# Patient Record
Sex: MALE | Race: WHITE | NOT HISPANIC OR LATINO | Employment: FULL TIME | URBAN - METROPOLITAN AREA
[De-identification: names, ages, dates, MRNs, and addresses within clinical notes are randomized per-mention and may not be internally consistent; named-entity substitution may affect disease eponyms.]

---

## 2019-12-31 ENCOUNTER — SURGERY - HEALTHEAST (OUTPATIENT)
Dept: SURGERY | Facility: CLINIC | Age: 24
End: 2019-12-31

## 2019-12-31 ENCOUNTER — OFFICE VISIT - HEALTHEAST (OUTPATIENT)
Dept: SURGERY | Facility: CLINIC | Age: 24
End: 2019-12-31

## 2019-12-31 ENCOUNTER — COMMUNICATION - HEALTHEAST (OUTPATIENT)
Dept: SURGERY | Facility: CLINIC | Age: 24
End: 2019-12-31

## 2019-12-31 DIAGNOSIS — K42.9 UMBILICAL HERNIA WITHOUT OBSTRUCTION AND WITHOUT GANGRENE: ICD-10-CM

## 2020-01-03 ENCOUNTER — COMMUNICATION - HEALTHEAST (OUTPATIENT)
Dept: SURGERY | Facility: CLINIC | Age: 25
End: 2020-01-03

## 2020-01-14 ENCOUNTER — SURGERY - HEALTHEAST (OUTPATIENT)
Dept: SURGERY | Facility: CLINIC | Age: 25
End: 2020-01-14

## 2020-01-14 DIAGNOSIS — K42.9 UMBILICAL HERNIA WITHOUT OBSTRUCTION AND WITHOUT GANGRENE: ICD-10-CM

## 2020-01-24 ASSESSMENT — MIFFLIN-ST. JEOR: SCORE: 2065.61

## 2020-01-27 ENCOUNTER — ANESTHESIA - HEALTHEAST (OUTPATIENT)
Dept: SURGERY | Facility: AMBULATORY SURGERY CENTER | Age: 25
End: 2020-01-27

## 2020-01-28 ENCOUNTER — SURGERY - HEALTHEAST (OUTPATIENT)
Dept: SURGERY | Facility: AMBULATORY SURGERY CENTER | Age: 25
End: 2020-01-28

## 2020-02-20 ENCOUNTER — OFFICE VISIT - HEALTHEAST (OUTPATIENT)
Dept: SURGERY | Facility: CLINIC | Age: 25
End: 2020-02-20

## 2020-02-20 DIAGNOSIS — Z48.89 POSTOPERATIVE VISIT: ICD-10-CM

## 2021-03-12 ENCOUNTER — AMBULATORY - HEALTHEAST (OUTPATIENT)
Dept: SURGERY | Facility: CLINIC | Age: 26
End: 2021-03-12

## 2021-03-12 DIAGNOSIS — Z11.59 ENCOUNTER FOR SCREENING FOR OTHER VIRAL DISEASES: ICD-10-CM

## 2021-03-19 ASSESSMENT — MIFFLIN-ST. JEOR: SCORE: 2062.2

## 2021-03-22 ENCOUNTER — COMMUNICATION - HEALTHEAST (OUTPATIENT)
Dept: TELEHEALTH | Facility: CLINIC | Age: 26
End: 2021-03-22

## 2021-03-22 ENCOUNTER — AMBULATORY - HEALTHEAST (OUTPATIENT)
Dept: LAB | Facility: CLINIC | Age: 26
End: 2021-03-22

## 2021-03-22 DIAGNOSIS — Z11.59 ENCOUNTER FOR SCREENING FOR OTHER VIRAL DISEASES: ICD-10-CM

## 2021-03-23 ENCOUNTER — ANESTHESIA - HEALTHEAST (OUTPATIENT)
Dept: SURGERY | Facility: CLINIC | Age: 26
End: 2021-03-23

## 2021-03-23 LAB
SARS-COV-2 PCR COMMENT: NORMAL
SARS-COV-2 RNA SPEC QL NAA+PROBE: NEGATIVE
SARS-COV-2 VIRUS SPECIMEN SOURCE: NORMAL

## 2021-03-24 ENCOUNTER — SURGERY - HEALTHEAST (OUTPATIENT)
Dept: SURGERY | Facility: CLINIC | Age: 26
End: 2021-03-24

## 2021-03-24 ENCOUNTER — COMMUNICATION - HEALTHEAST (OUTPATIENT)
Dept: SCHEDULING | Facility: CLINIC | Age: 26
End: 2021-03-24

## 2021-03-24 ASSESSMENT — MIFFLIN-ST. JEOR: SCORE: 2005.79

## 2021-05-27 VITALS — DIASTOLIC BLOOD PRESSURE: 68 MMHG | SYSTOLIC BLOOD PRESSURE: 128 MMHG | OXYGEN SATURATION: 100 % | HEART RATE: 109 BPM

## 2021-06-04 VITALS
DIASTOLIC BLOOD PRESSURE: 68 MMHG | HEART RATE: 91 BPM | OXYGEN SATURATION: 98 % | BODY MASS INDEX: 32.41 KG/M2 | SYSTOLIC BLOOD PRESSURE: 126 MMHG | WEIGHT: 239 LBS

## 2021-06-04 VITALS — BODY MASS INDEX: 30.09 KG/M2 | HEIGHT: 73 IN | WEIGHT: 227 LBS

## 2021-06-04 NOTE — TELEPHONE ENCOUNTER
Received a call from [facility]. They did James's pre-op physical today and his PCP did not clear James for surgery. Per [facility], James's liver function tests came back abnormal. Patient is aware.    I called [facility] and spoke with Burak to cancel the case.     Cyndee Gooden,  Surgery Scheduling  [phone]

## 2021-06-04 NOTE — PROGRESS NOTES
HPI:  I am consulted by the ER in this 24 y.o. male regarding an umbilical hernia. He has noted this for the past 10 days. He has discomfort and a bulge. The pain is mild.    No Known Allergies      Current Outpatient Medications:      ibuprofen (ADVIL,MOTRIN) 800 MG tablet, Take 800 mg by mouth., Disp: , Rfl:     History reviewed. No pertinent past medical history.    Past Surgical History:   Procedure Laterality Date     WISDOM TOOTH EXTRACTION         Social History     Socioeconomic History     Marital status: Single     Spouse name: Not on file     Number of children: Not on file     Years of education: Not on file     Highest education level: Not on file   Occupational History     Not on file   Social Needs     Financial resource strain: Not on file     Food insecurity:     Worry: Not on file     Inability: Not on file     Transportation needs:     Medical: Not on file     Non-medical: Not on file   Tobacco Use     Smoking status: Former Smoker     Smokeless tobacco: Former User   Substance and Sexual Activity     Alcohol use: Not Currently     Drug use: Never     Sexual activity: Not on file   Lifestyle     Physical activity:     Days per week: Not on file     Minutes per session: Not on file     Stress: Not on file   Relationships     Social connections:     Talks on phone: Not on file     Gets together: Not on file     Attends Protestant service: Not on file     Active member of club or organization: Not on file     Attends meetings of clubs or organizations: Not on file     Relationship status: Not on file     Intimate partner violence:     Fear of current or ex partner: Not on file     Emotionally abused: Not on file     Physically abused: Not on file     Forced sexual activity: Not on file   Other Topics Concern     Not on file   Social History Narrative     Not on file       Review of Systems - Negative except as noted in the HPI    /68 (Patient Site: Right Arm, Patient Position: Sitting, Cuff  Size: Adult Regular)   Pulse 91   Wt (!) 239 lb (108.4 kg)   SpO2 98%   BMI 32.41 kg/m    Body mass index is 32.41 kg/m .    EXAM:  GENERAL: Obese male in no distress.  CARDIAC: RRR w/out murmur   CHEST/LUNG: Clear  ABDOMEN:  Umbilical hernia.  Abdomen soft and nontender.        Assessment/Plan: This patient has a  umbilical hernia. I discussed this at length with him.  I went over conservative management as well as surgical treatment of this.  I will plan on doing this via an open approach, with a supra-umbilical incision. I went over the small risks of surgery including but not limited to bleeding and infection. I discussed the expected recovery time as well. He will have an activity restriction for 4 weeks of no lifting over 20 pounds and no strenuous activity.   Will get this scheduled.      Fran Cheng MD  [facility] Department of Surgery

## 2021-06-05 VITALS — WEIGHT: 215.56 LBS | HEIGHT: 72 IN | BODY MASS INDEX: 29.2 KG/M2

## 2021-06-05 NOTE — ANESTHESIA POSTPROCEDURE EVALUATION
Patient: James Carroll  Procedure(s):  HERNIORRHAPHY, UMBILICAL, OPEN  Anesthesia type: MAC    Patient location: Phase II Recovery  Last vitals:   Vitals Value Taken Time   /62 1/28/2020  1:25 PM   Temp 36.4  C (97.6  F) 1/28/2020 12:54 PM   Pulse 63 1/28/2020  1:26 PM   Resp 16 1/28/2020  1:00 PM   SpO2 96 % 1/28/2020  1:26 PM   Vitals shown include unvalidated device data.  Post vital signs: stable  Level of consciousness: awake and responds to simple questions  Post-anesthesia pain: pain controlled  Post-anesthesia nausea and vomiting: no  Pulmonary: unassisted, return to baseline  Cardiovascular: stable and blood pressure at baseline  Hydration: adequate  Anesthetic events: no    QCDR Measures:  ASA# 11 - [place]-op Cardiac Arrest: ASA11B - Patient did NOT experience unanticipated cardiac arrest  ASA# 12 - [place]-op Mortality Rate: ASA12B - Patient did NOT die  ASA# 13 - PACU Re-Intubation Rate: NA - No ETT / LMA used for case  ASA# 10 - Composite Anes Safety: ASA10A - No serious adverse event    Additional Notes:

## 2021-06-05 NOTE — ANESTHESIA CARE TRANSFER NOTE
Last vitals:   Vitals:    01/28/20 1254   BP: (P) 104/55   Pulse: (P) 79   Resp: (P) 16   Temp: (P) 36.4  C (97.6  F)   SpO2: (P) 96%     Pt brought to phase 2 on 6L O2. Monitors applied. VSS.    Patient's level of consciousness is drowsy  Spontaneous respirations: yes  Maintains airway independently: yes  Dentition unchanged: yes  Oropharynx: oropharynx clear of all foreign objects    QCDR Measures:  ASA# 20 - Surgical Safety Checklist: WHO surgical safety checklist completed prior to induction    PQRS# 430 - Adult PONV Prevention: 4558F - Pt received => 2 anti-emetic agents (different classes) preop & intraop  ASA# 8 - Peds PONV Prevention: NA - Not pediatric patient, not GA or 2 or more risk factors NOT present  PQRS# 424 - Mari[place]-op Temp Management: 4559F - At least one body temp DOCUMENTED => 35.5C or 95.9F within required timeframe  PQRS# 426 - PACU Transfer Protocol: - Transfer of care checklist used  ASA# 14 - Acute Post-op Pain: ASA14B - Patient did NOT experience pain >= 7 out of 10

## 2021-06-05 NOTE — ANESTHESIA PREPROCEDURE EVALUATION
Anesthesia Evaluation      Patient summary reviewed   No history of anesthetic complications     Airway   Mallampati: II  Neck ROM: full   Pulmonary - normal exam   (+) asthma (no sx since 2011)  a smoker (quit 2019)                         Cardiovascular - negative ROS and normal exam  Exercise tolerance: > or = 4 METS   Neuro/Psych - negative ROS     Endo/Other    (+) obesity (bmi 30),      GI/Hepatic/Renal - negative ROS     Comments: Transient LFT elevation - u/s normal, labs normalized          Dental - normal exam                        Anesthesia Plan  Planned anesthetic: MAC  Versed/fentanyl/propofol  Ketamine PRN  Decadron/zofran    ASA 2   Induction: intravenous   Anesthetic plan and risks discussed with: patient and parent/guardian  Anesthesia plan special considerations: antiemetics,   Post-op plan: routine recovery        Results for orders placed or performed during the hospital encounter of 12/27/19   HM2(CBC w/o Differential)   Result Value Ref Range    WBC 8.8 4.0 - 11.0 thou/uL    RBC 5.20 4.40 - 6.20 mill/uL    Hemoglobin 15.8 14.0 - 18.0 g/dL    Hematocrit 46.5 40.0 - 54.0 %    MCV 89 80 - 100 fL    MCH 30.4 27.0 - 34.0 pg    MCHC 34.0 32.0 - 36.0 g/dL    RDW 11.6 11.0 - 14.5 %    Platelets 257 140 - 440 thou/uL    MPV 9.4 8.5 - 12.5 fL   Hepatic Profile   Result Value Ref Range    Bilirubin, Total 0.5 0.0 - 1.0 mg/dL    Bilirubin, Direct 0.2 <=0.5 mg/dL    Protein, Total 7.5 6.0 - 8.0 g/dL    Albumin 4.5 3.5 - 5.0 g/dL    Alkaline Phosphatase 63 45 - 120 U/L     (H) 0 - 40 U/L     (H) 0 - 45 U/L   Basic Metabolic Panel   Result Value Ref Range    Sodium 140 136 - 145 mmol/L    Potassium 3.6 3.5 - 5.0 mmol/L    Chloride 108 (H) 98 - 107 mmol/L    CO2 21 (L) 22 - 31 mmol/L    Anion Gap, Calculation 11 5 - 18 mmol/L    Glucose 107 70 - 125 mg/dL    Calcium 9.8 8.5 - 10.5 mg/dL    BUN 12 8 - 22 mg/dL    Creatinine 0.76 0.70 - 1.30 mg/dL    GFR MDRD Af Amer >60 >60 mL/min/1.73m2    GFR  MDRD Non Af Amer >60 >60 mL/min/1.73m2   Lipase   Result Value Ref Range    Lipase 13 0 - 52 U/L

## 2021-06-06 NOTE — PROGRESS NOTES
HPI: Pt is here for follow up umbilical hernia repair with Dr. Cheng on 1/28/2020.   he is doing well.  Pain is well controlled.  No difficulties with the surgical wound/wounds.  he is eating well and denies fever and chills.         /68 (Patient Site: Right Arm, Patient Position: Sitting, Cuff Size: Adult Regular)   Pulse (!) 109   SpO2 100%     EXAM:  GENERAL:Appears well  ABDOMEN:  Soft, +BS  SURGICAL WOUNDS:  Incisions healing well, no enduration or drainage.      Assessment/Plan: Doing well after surgery and should follow up as needed.    Kassi Clement , [facility] Surgery

## 2021-06-12 ENCOUNTER — HEALTH MAINTENANCE LETTER (OUTPATIENT)
Age: 26
End: 2021-06-12

## 2021-06-16 PROBLEM — K42.9 UMBILICAL HERNIA WITHOUT OBSTRUCTION AND WITHOUT GANGRENE: Status: ACTIVE | Noted: 2020-01-02

## 2021-06-16 NOTE — ANESTHESIA PREPROCEDURE EVALUATION
Anesthesia Evaluation      Patient summary reviewed   No history of anesthetic complications     Airway   Mallampati: II  Neck ROM: full   Pulmonary - normal exam                          Cardiovascular - negative ROS and normal exam   Neuro/Psych - negative ROS     Endo/Other - negative ROS      GI/Hepatic/Renal - negative ROS           Dental                         Anesthesia Plan  Planned anesthetic: general LMA    ASA 2   Induction: intravenous   Anesthetic plan and risks discussed with: patient

## 2021-06-16 NOTE — ANESTHESIA CARE TRANSFER NOTE
Last vitals:   Vitals:    03/24/21 1018   BP: 129/73   Pulse: 77   Resp: 12   Temp: 36.2  C (97.2  F)   SpO2: 100%     Patient's level of consciousness is drowsy  Spontaneous respirations: yes  Maintains airway independently: yes  Dentition unchanged: yes  Oropharynx: oropharynx clear of all foreign objects    QCDR Measures:  ASA# 20 - Surgical Safety Checklist: WHO surgical safety checklist completed prior to induction    PQRS# 430 - Adult PONV Prevention: 4558F - Pt received => 2 anti-emetic agents (different classes) preop & intraop  ASA# 8 - Peds PONV Prevention: NA - Not pediatric patient, not GA or 2 or more risk factors NOT present  PQRS# 424 - [place]-op Temp Management: 4559F - At least one body temp DOCUMENTED => 35.5C or 95.9F within required timeframe  PQRS# 426 - PACU Transfer Protocol: - Transfer of care checklist used  ASA# 14 - Acute Post-op Pain: ASA14B - Patient did NOT experience pain >= 7 out of 10

## 2021-06-16 NOTE — ANESTHESIA POSTPROCEDURE EVALUATION
Patient: James Carroll  Procedure(s):  REMOVAL, HARDWARE, LOWER EXTREMITY (Right)  MEDIAL MALLEOLAR OSTEOTOMY WITH BULK OSTEOCHONDRAL ALLOGRAFT TALAR DOME (Right)  Anesthesia type: general    Patient location: PACU  Last vitals:   Vitals Value Taken Time   /86 03/24/21 1100   Temp 36.2  C (97.1  F) 03/24/21 1050   Pulse 86 03/24/21 1119   Resp 16 03/24/21 1100   SpO2 96 % 03/24/21 1120   Vitals shown include unvalidated device data.  Post vital signs: stable  Level of consciousness: awake and responds to simple questions  Post-anesthesia pain: pain controlled  Post-anesthesia nausea and vomiting: no  Pulmonary: unassisted, return to baseline  Cardiovascular: stable and blood pressure at baseline  Hydration: adequate  Anesthetic events: no    QCDR Measures:  ASA# 11 - [place]-op Cardiac Arrest: ASA11B - Patient did NOT experience unanticipated cardiac arrest  ASA# 12 - [place]-op Mortality Rate: ASA12B - Patient did NOT die  ASA# 13 - PACU Re-Intubation Rate: ASA13B - Patient did NOT require a new airway mgmt  ASA# 10 - Composite Anes Safety: ASA10A - No serious adverse event    Additional Notes:

## 2021-06-16 NOTE — ANESTHESIA PROCEDURE NOTES
Peripheral Block    Patient location during procedure: pre-op  Start time: 3/24/2021 6:35 AM  End time: 3/24/2021 6:40 AM  post-op analgesia per surgeon order as noted in medical record  Staffing:  Performing  Anesthesiologist: Quinten Spann IV, MD  Preanesthetic Checklist  Completed: patient identified, site marked, risks, benefits, and alternatives discussed, timeout performed, consent obtained, at patient's request, airway assessed, oxygen available, suction available, emergency drugs available and hand hygiene performed  Peripheral Block  Block type: sciatic, popliteal  Prep: ChloraPrep  Patient position: supine  Patient monitoring: cardiac monitor, continuous pulse oximetry, blood pressure and heart rate  Laterality: right  Injection technique: ultrasound guided    Ultrasound used to visualize needle placement in proximity to nerve being blocked: yes   US used to visualize anesthetic spread  Visualized anatomic structures normal  No Pathological Findings  Permanent ultrasound image captured for medical record  Sterile gel and probe cover used for ultrasound.  Needle  Needle type: echogenic   Needle gauge: 20G  Needle length: 4 in  no peripheral nerve catheter placed  Assessment  Injection assessment: negative aspiration for heme, no paresthesia on injection, incremental injection and no difficulty with injection

## 2021-06-16 NOTE — ANESTHESIA PROCEDURE NOTES
Peripheral Block    Patient location during procedure: pre-op  Start time: 3/24/2021 6:40 AM  End time: 3/24/2021 6:42 AM  post-op analgesia per surgeon order as noted in medical record  Staffing:  Performing  Anesthesiologist: Quinten Spann IV, MD  Preanesthetic Checklist  Completed: patient identified, site marked, risks, benefits, and alternatives discussed, timeout performed, consent obtained, at patient's request, airway assessed, oxygen available, suction available, emergency drugs available and hand hygiene performed  Peripheral Block  Block type: saphenous, adductor canal block  Prep: ChloraPrep  Patient position: supine  Patient monitoring: cardiac monitor, continuous pulse oximetry, heart rate and blood pressure  Laterality: right  Injection technique: ultrasound guided    Ultrasound used to visualize needle placement in proximity to nerve being blocked: yes   US used to visualize anesthetic spread  Visualized anatomic structures normal  No Pathological Findings  Permanent ultrasound image captured for medical record    Needle  Needle type: echogenic   Needle gauge: 20G  Needle length: 4 in  no peripheral nerve catheter placed  Assessment  Injection assessment: negative aspiration for heme, no paresthesia on injection, no difficulty with injection and incremental injection

## 2021-06-20 NOTE — LETTER
Letter by Fran Cheng MD at      Author: Fran Cheng MD Service: -- Author Type: --    Filed:  Encounter Date: 12/31/2019 Status: Signed         December 31, 2019     Patient: James Carroll   YOB: 1995   Date of Visit: 12/31/2019   Date of Surgery: 1/7/2020    To Whom It May Concern:    James Carroll is scheduled for surgery on 1/7/20. Mr. Carroll will be unable to perform any duties from 1/7/20 - 2/4/20 during recovery time. He may return to work on 2/5/20 with no restrictions. .    If you have any questions or concerns, please don't hesitate to call.    Sincerely,        Electronically signed by Fran Cheng MD

## 2021-07-03 NOTE — ADDENDUM NOTE
Addendum Note by Fran Cheng MD at 1/14/2020 10:54 AM     Author: Fran Cheng MD Service: -- Author Type: Physician    Filed: 1/22/2020 10:49 AM Encounter Date: 1/14/2020 Status: Signed    : Fran Cheng MD (Physician)    Addended by: FRAN CHENG on: 1/22/2020 10:49 AM        Modules accepted: Orders         Adequate: hears normal conversation without difficulty

## 2021-10-02 ENCOUNTER — HEALTH MAINTENANCE LETTER (OUTPATIENT)
Age: 26
End: 2021-10-02

## 2022-04-05 ENCOUNTER — HOSPITAL ENCOUNTER (EMERGENCY)
Facility: CLINIC | Age: 27
Discharge: HOME | End: 2022-04-05
Attending: EMERGENCY MEDICINE | Admitting: EMERGENCY MEDICINE
Payer: COMMERCIAL

## 2022-04-05 VITALS
BODY MASS INDEX: 23.89 KG/M2 | WEIGHT: 176.37 LBS | HEART RATE: 68 BPM | RESPIRATION RATE: 16 BRPM | SYSTOLIC BLOOD PRESSURE: 115 MMHG | HEIGHT: 72 IN | TEMPERATURE: 98.9 F | DIASTOLIC BLOOD PRESSURE: 76 MMHG | OXYGEN SATURATION: 99 %

## 2022-04-05 DIAGNOSIS — M54.10 RADICULAR PAIN: ICD-10-CM

## 2022-04-05 DIAGNOSIS — M62.838 MUSCLE SPASM: ICD-10-CM

## 2022-04-05 PROCEDURE — 250N000013 HC RX MED GY IP 250 OP 250 PS 637: Performed by: EMERGENCY MEDICINE

## 2022-04-05 PROCEDURE — 99284 EMERGENCY DEPT VISIT MOD MDM: CPT | Mod: 25

## 2022-04-05 PROCEDURE — 96372 THER/PROPH/DIAG INJ SC/IM: CPT | Performed by: EMERGENCY MEDICINE

## 2022-04-05 PROCEDURE — 250N000011 HC RX IP 250 OP 636: Performed by: EMERGENCY MEDICINE

## 2022-04-05 RX ORDER — LIDOCAINE 4 G/G
1 PATCH TOPICAL ONCE
Status: DISCONTINUED | OUTPATIENT
Start: 2022-04-05 | End: 2022-04-05 | Stop reason: HOSPADM

## 2022-04-05 RX ORDER — LIDOCAINE 50 MG/G
1 PATCH TOPICAL EVERY 24 HOURS
Qty: 6 PATCH | Refills: 0 | Status: SHIPPED | OUTPATIENT
Start: 2022-04-05 | End: 2022-04-15

## 2022-04-05 RX ORDER — CYCLOBENZAPRINE HCL 10 MG
10 TABLET ORAL 3 TIMES DAILY PRN
Qty: 18 TABLET | Refills: 0 | Status: SHIPPED | OUTPATIENT
Start: 2022-04-05 | End: 2022-04-11

## 2022-04-05 RX ORDER — IBUPROFEN 800 MG/1
800 TABLET, FILM COATED ORAL EVERY 8 HOURS PRN
Qty: 24 TABLET | Refills: 0 | Status: SHIPPED | OUTPATIENT
Start: 2022-04-05 | End: 2022-04-13

## 2022-04-05 RX ORDER — KETOROLAC TROMETHAMINE 30 MG/ML
30 INJECTION, SOLUTION INTRAMUSCULAR; INTRAVENOUS ONCE
Status: COMPLETED | OUTPATIENT
Start: 2022-04-05 | End: 2022-04-05

## 2022-04-05 RX ADMIN — KETOROLAC TROMETHAMINE 30 MG: 30 INJECTION, SOLUTION INTRAMUSCULAR; INTRAVENOUS at 03:24

## 2022-04-05 RX ADMIN — LIDOCAINE 1 PATCH: 246 PATCH TOPICAL at 03:20

## 2022-04-05 ASSESSMENT — ENCOUNTER SYMPTOMS
BACK PAIN: 1
NECK PAIN: 1

## 2022-04-05 NOTE — ED PROVIDER NOTES
EMERGENCY DEPARTMENT ENCOUNTER      NAME: James Carroll  AGE: 26 year old male  YOB: 1995  MRN: [id number]  EVALUATION DATE & TIME: 4/5/2022  3:00 AM    PCP: No primary care provider on file.    ED PROVIDER: Radha Miller M.D.      Chief Complaint   Patient presents with     Shoulder Pain         FINAL IMPRESSION:  1. Muscle spasm    2. Radicular pain        MEDICAL DECISION MAKING:    Pertinent Labs & Imaging studies reviewed. (See chart for details)  ED Course as of 04/05/22 0547   Tue Apr 05, 2022   0317 Afebrile.  Vital signs are unremarkable.  Patient is coming in with right-sided neck pain that radiates down his shoulder and down his arm.  Does have a history he states of bulging disks at C2 and C5.  No falls or recent injury.  No procedures.  No numbness or tingling just this radicular type pain that he is describing.  Worse when positioning of his head.  Worse with different positions of his arm or shoulder.Physical exam for patient here with strong  strength distally.  Strong radial and ulnar pulses.  Does not have any tenderness or discomfort with pronation and supination as well as any movement of his elbow.  Am able to passively range his shoulder.  He does have pain when he looks down towards his hand.  Has significant muscle tenderness along the trapezius muscle on that side.  Does not have any midline C-spine tenderness.Patient likely with muscle spasm causing some nerve entrapment leading to radicular pain.  Has not taken any medications prior to coming in.  We will give him a lidocaine patch here, he did drive so we will give him Toradol.  Instructed to use heat.  Will discharge with exercise regimen, ibuprofen, Flexeril.           Critical care: 0 minutes excluding separately billable procedures.  Includes bedside management, time reviewing test results, review of records, discussing the case with staff, documenting the medical record and time spent with family members  (or surrogate decision makers) discussing specific treatment issues.          ED COURSE:  3:05 AM I met with the patient, obtained history, performed an initial exam, and discussed options and plan for diagnostics and treatment here in the ED. We discussed the plan for discharge and the patient is agreeable. Reviewed supportive cares, symptomatic treatment, outpatient follow up, and reasons to return to the Emergency Department. Patient to be discharged by ED RN.     The importance of close follow up was discussed. We reviewed warning signs and symptoms, and I instructed Mr. Carroll to return to the emergency department immediately if he develops any new or worsening symptoms. I provided additional verbal discharge instructions. Mr. Carroll expressed understanding and agreement with this plan of care, his questions were answered, and he was discharged in stable condition.     MEDICATIONS GIVEN IN THE EMERGENCY:  Medications   Lidocaine (LIDOCARE) 4 % Patch 1 patch (1 patch Transdermal Patch/Med Applied 4/5/22 0320)     And   lidocaine patch in PLACE (has no administration in time range)   ketorolac (TORADOL) injection 30 mg (30 mg Intramuscular Given 4/5/22 0324)       NEW PRESCRIPTIONS STARTED AT TODAY'S ER VISIT:  Discharge Medication List as of 4/5/2022  3:41 AM      START taking these medications    Details   cyclobenzaprine (FLEXERIL) 10 MG tablet Take 1 tablet (10 mg) by mouth 3 times daily as needed for muscle spasms, Disp-18 tablet, R-0, Local Print      !! ibuprofen (ADVIL/MOTRIN) 800 MG tablet Take 1 tablet (800 mg) by mouth every 8 hours as needed for moderate pain, Disp-24 tablet, R-0, Local Print      lidocaine (LIDODERM) 5 % patch Place 1 patch onto the skin every 24 hours for 10 daysDisp-6 patch, R-0Local Print       !! - Potential duplicate medications found. Please discuss with provider.             =================================================================    HPI    Patient information  was obtained from: Patient    Use of : N/A    James Carroll is a 26 year old male with no pertinent history who presents to the ED via walk-in for the evaluation of neck pain.    Patient reports right-sided neck pain that started two days ago in the morning. He states that pain radiates to right shoulder and down his spine. Patient notes that pain is worse with movement. He has not tried anything fo relief of symptoms. Patient reports that last July, he states that he was told he had tissue and nerve damage to C2 and C5. Otherwise, he denies any weakness, recent falls, trauma, or injury to area. No other complaints at this time.    REVIEW OF SYSTEMS   Review of Systems   Musculoskeletal: Positive for back pain (secondary to neck pain) and neck pain (right-sided).        Positive for right shoulder pain secondary to neck pain   All other systems reviewed and are negative.    PAST MEDICAL HISTORY:  Past Medical History:   Diagnosis Date     Asthma        PAST SURGICAL HISTORY:  Past Surgical History:   Procedure Laterality Date     ANKLE SURGERY      Twice     HERNIA REPAIR, UMBILICAL N/A 1/28/2020    Procedure: HERNIORRHAPHY, UMBILICAL, OPEN;  Surgeon: Fran Cheng MD;  Location: [facility];  Service: General     REMOVE HARDWARE LOWER EXTREMITY Right 3/24/2021    Procedure: REMOVAL, HARDWARE, RIGHT ANKLE;  Surgeon: Bryce Mott DO;  Location: [facility] OR;  Service: Orthopedics     WISDOM TOOTH EXTRACTION       ZZC OSTEOTOMY TIBIA Right 3/24/2021    Procedure: RIGHT MEDIAL MALLEOLAR OSTEOTOMY WITH BULK OSTEOCHONDRAL ALLOGRAFT TALAR DOME;  Surgeon: Bryce Mott DO;  Location: [facility];  Service: Orthopedics       CURRENT MEDICATIONS:      Current Facility-Administered Medications:      Lidocaine (LIDOCARE) 4 % Patch 1 patch, 1 patch, Transdermal, Once, 1 patch at 04/05/22 0320 **AND** lidocaine patch in PLACE, , Transdermal, Q8H, Katia Miller MD    Current  Outpatient Medications:      cyclobenzaprine (FLEXERIL) 10 MG tablet, Take 1 tablet (10 mg) by mouth 3 times daily as needed for muscle spasms, Disp: 18 tablet, Rfl: 0     ibuprofen (ADVIL/MOTRIN) 800 MG tablet, Take 1 tablet (800 mg) by mouth every 8 hours as needed for moderate pain, Disp: 24 tablet, Rfl: 0     lidocaine (LIDODERM) 5 % patch, Place 1 patch onto the skin every 24 hours for 10 days, Disp: 6 patch, Rfl: 0     acetaminophen (TYLENOL) 325 MG tablet, [ACETAMINOPHEN (TYLENOL) 325 MG TABLET] Take 2 tablets (650 mg total) by mouth every 4 (four) hours as needed for pain (mild pain)., Disp: 100 tablet, Rfl: 0     aspirin 81 MG EC tablet, [ASPIRIN 81 MG EC TABLET] Take 1 tablet (81 mg total) by mouth 2 (two) times a day., Disp: 60 tablet, Rfl: 0     hydrOXYzine HCL (ATARAX) 25 MG tablet, [HYDROXYZINE HCL (ATARAX) 25 MG TABLET] Take 1 tablet (25 mg total) by mouth every 6 (six) hours as needed for itching (with pain, moderate pain)., Disp: 30 tablet, Rfl: 0     ibuprofen (ADVIL,MOTRIN) 200 MG tablet, [IBUPROFEN (ADVIL,MOTRIN) 200 MG TABLET] Take 200 mg by mouth every 6 (six) hours as needed for pain., Disp: , Rfl:      oxyCODONE (ROXICODONE) 5 MG immediate release tablet, [OXYCODONE (ROXICODONE) 5 MG IMMEDIATE RELEASE TABLET] Take 1-2 tablets (5-10 mg total) by mouth every 4 (four) hours as needed for pain (moderate to severe pain)., Disp: 30 tablet, Rfl: 0     senna-docusate (PERICOLACE) 8.6-50 mg tablet, [SENNA-DOCUSATE (PERICOLACE) 8.6-50 MG TABLET] Take 1-2 tablets by mouth 2 (two) times a day. Take while on oral narcotics to prevent or treat constipation., Disp: 30 tablet, Rfl: 0    ALLERGIES:  No Known Allergies    FAMILY HISTORY:  History reviewed. No pertinent family history.    SOCIAL HISTORY:   Social History     Socioeconomic History     Marital status: Single     Spouse name: Not on file     Number of children: Not on file     Years of education: Not on file     Highest education level: Not on  file   Occupational History     Not on file   Tobacco Use     Smoking status: Former Smoker     Smokeless tobacco: Former User   Substance and Sexual Activity     Alcohol use: Not Currently     Drug use: Never     Sexual activity: Not on file   Other Topics Concern     Not on file   Social History Narrative     Not on file     Social Determinants of Health     Financial Resource Strain: Not on file   Food Insecurity: Not on file   Transportation Needs: Not on file   Physical Activity: Not on file   Stress: Not on file   Social Connections: Not on file   Intimate Partner Violence: Not on file   Housing Stability: Not on file       PHYSICAL EXAM:    Vitals: /76   Pulse 68   Temp 98.9  F (37.2  C)   Resp 16   Ht 1.829 m (6')   Wt 80 kg (176 lb 5.9 oz)   SpO2 99%   BMI 23.92 kg/m     General:. Alert and interactive, comfortable appearing.  HENT: Oropharynx without erythema or exudates. MMM.  TMs clear bilaterally.  Eyes: Pupils mid-sized and equally reactive.   Neck: Full AROM.  No midline tenderness to palpation. Pain when looking down at hand. Significant muscle tenderness along the trapezius muscle on right-side.  Cardiovascular: Regular rate and rhythm. Peripheral pulses 2+ bilaterally.  Chest/Pulmonary: Normal work of breathing. Lung sounds clear and equal throughout, no wheezes or crackles. No chest wall tenderness or deformities.  Abdomen: Soft, nondistended. Nontender without guarding or rebound.  Back/Spine: No CVA or midline tenderness.  Extremities: Normal ROM of all major joints. No lower extremity edema. Strong radial and ulnar pulses. No tenderness or discomfort with pronation and supination as well as any movement of right elbow. Able to passively range right shoulder.   Skin: Warm and dry. Normal skin color.   Neuro: Speech clear. CNs grossly intact. Moves all extremities appropriately. Strength and sensation grossly intact to all extremities. Strong  strength distally.   Psych: Normal  affect/mood, cooperative, memory appropriate.     LAB:  All pertinent labs reviewed and interpreted.  Labs Ordered and Resulted from Time of ED Arrival to Time of ED Departure - No data to display    RADIOLOGY:  No orders to display         I, Carley Peng, am serving as a scribe to document services personally performed by Dr. Radha Miller  based on my observation and the provider's statements to me. I, Radha Miller MD attest that Carley Peng is acting in a scribe capacity, has observed my performance of the services and has documented them in accordance with my direction.      Radha Miller M.D.  Emergency Medicine  [facility] EMERGENCY ROOM  [address] [address] [phone]  Dept: [phone]     Katia Miller MD  04/05/22 0523

## 2022-04-05 NOTE — ED TRIAGE NOTES
Patient has a history of neck pain that spreads to his back and shoulders. Patient was sitting down and started to have shoulder pain that worsened and began to should down his right arm. Pain started on Sunday morning. Patient was not performing any activities that could have triggered pain response.

## 2022-04-05 NOTE — DISCHARGE INSTRUCTIONS
Please take medications as prescribed for pain.  I did put in a referral to establish primary care to discuss this further if this continues to be ongoing.  Return for any new or worsening symptoms.

## 2022-07-09 ENCOUNTER — HEALTH MAINTENANCE LETTER (OUTPATIENT)
Age: 27
End: 2022-07-09

## 2022-07-13 ENCOUNTER — HOSPITAL ENCOUNTER (EMERGENCY)
Facility: CLINIC | Age: 27
Discharge: HOME | End: 2022-07-13
Attending: EMERGENCY MEDICINE | Admitting: EMERGENCY MEDICINE
Payer: COMMERCIAL

## 2022-07-13 ENCOUNTER — APPOINTMENT (OUTPATIENT)
Dept: RADIOLOGY | Facility: CLINIC | Age: 27
End: 2022-07-13
Attending: EMERGENCY MEDICINE
Payer: COMMERCIAL

## 2022-07-13 VITALS
SYSTOLIC BLOOD PRESSURE: 125 MMHG | OXYGEN SATURATION: 98 % | RESPIRATION RATE: 14 BRPM | TEMPERATURE: 98.2 F | HEART RATE: 56 BPM | WEIGHT: 175 LBS | DIASTOLIC BLOOD PRESSURE: 78 MMHG | BODY MASS INDEX: 23.73 KG/M2

## 2022-07-13 DIAGNOSIS — R07.9 CHEST PAIN, UNSPECIFIED TYPE: ICD-10-CM

## 2022-07-13 LAB
ALBUMIN SERPL-MCNC: 4.3 G/DL (ref 3.5–5)
ALP SERPL-CCNC: 61 U/L (ref 45–120)
ALT SERPL W P-5'-P-CCNC: 26 U/L (ref 0–45)
ANION GAP SERPL CALCULATED.3IONS-SCNC: 6 MMOL/L (ref 5–18)
AST SERPL W P-5'-P-CCNC: 29 U/L (ref 0–40)
BASOPHILS # BLD AUTO: 0.1 10E3/UL (ref 0–0.2)
BASOPHILS NFR BLD AUTO: 1 %
BILIRUB SERPL-MCNC: 0.6 MG/DL (ref 0–1)
BUN SERPL-MCNC: 13 MG/DL (ref 8–22)
CALCIUM SERPL-MCNC: 9.5 MG/DL (ref 8.5–10.5)
CHLORIDE BLD-SCNC: 107 MMOL/L (ref 98–107)
CO2 SERPL-SCNC: 26 MMOL/L (ref 22–31)
CREAT SERPL-MCNC: 0.82 MG/DL (ref 0.7–1.3)
EOSINOPHIL # BLD AUTO: 0.1 10E3/UL (ref 0–0.7)
EOSINOPHIL NFR BLD AUTO: 1 %
ERYTHROCYTE [DISTWIDTH] IN BLOOD BY AUTOMATED COUNT: 11.5 % (ref 10–15)
GFR SERPL CREATININE-BSD FRML MDRD: >90 ML/MIN/1.73M2
GLUCOSE BLD-MCNC: 88 MG/DL (ref 70–125)
HCT VFR BLD AUTO: 43.8 % (ref 40–53)
HGB BLD-MCNC: 15 G/DL (ref 13.3–17.7)
IMM GRANULOCYTES # BLD: 0 10E3/UL
IMM GRANULOCYTES NFR BLD: 0 %
LYMPHOCYTES # BLD AUTO: 1.9 10E3/UL (ref 0.8–5.3)
LYMPHOCYTES NFR BLD AUTO: 25 %
MAGNESIUM SERPL-MCNC: 2.1 MG/DL (ref 1.8–2.6)
MCH RBC QN AUTO: 31.1 PG (ref 26.5–33)
MCHC RBC AUTO-ENTMCNC: 34.2 G/DL (ref 31.5–36.5)
MCV RBC AUTO: 91 FL (ref 78–100)
MONOCYTES # BLD AUTO: 0.5 10E3/UL (ref 0–1.3)
MONOCYTES NFR BLD AUTO: 7 %
NEUTROPHILS # BLD AUTO: 4.9 10E3/UL (ref 1.6–8.3)
NEUTROPHILS NFR BLD AUTO: 66 %
NRBC # BLD AUTO: 0 10E3/UL
NRBC BLD AUTO-RTO: 0 /100
PLATELET # BLD AUTO: 227 10E3/UL (ref 150–450)
POTASSIUM BLD-SCNC: 4.5 MMOL/L (ref 3.5–5)
PROT SERPL-MCNC: 7.1 G/DL (ref 6–8)
RBC # BLD AUTO: 4.82 10E6/UL (ref 4.4–5.9)
SODIUM SERPL-SCNC: 139 MMOL/L (ref 136–145)
TROPONIN I SERPL-MCNC: <0.01 NG/ML (ref 0–0.29)
WBC # BLD AUTO: 7.4 10E3/UL (ref 4–11)

## 2022-07-13 PROCEDURE — 80053 COMPREHEN METABOLIC PANEL: CPT | Performed by: EMERGENCY MEDICINE

## 2022-07-13 PROCEDURE — 99285 EMERGENCY DEPT VISIT HI MDM: CPT | Mod: 25

## 2022-07-13 PROCEDURE — 71046 X-RAY EXAM CHEST 2 VIEWS: CPT

## 2022-07-13 PROCEDURE — 84484 ASSAY OF TROPONIN QUANT: CPT | Performed by: EMERGENCY MEDICINE

## 2022-07-13 PROCEDURE — 36415 COLL VENOUS BLD VENIPUNCTURE: CPT | Performed by: EMERGENCY MEDICINE

## 2022-07-13 PROCEDURE — 85025 COMPLETE CBC W/AUTO DIFF WBC: CPT | Performed by: EMERGENCY MEDICINE

## 2022-07-13 PROCEDURE — 83735 ASSAY OF MAGNESIUM: CPT | Performed by: EMERGENCY MEDICINE

## 2022-07-13 PROCEDURE — 93005 ELECTROCARDIOGRAM TRACING: CPT | Performed by: EMERGENCY MEDICINE

## 2022-07-13 ASSESSMENT — ENCOUNTER SYMPTOMS
GASTROINTESTINAL NEGATIVE: 1
NAUSEA: 0
COUGH: 0
FEVER: 0
LIGHT-HEADEDNESS: 1
CHEST TIGHTNESS: 1
DIZZINESS: 1
VOMITING: 0
SHORTNESS OF BREATH: 1
DIARRHEA: 0

## 2022-07-13 NOTE — ED TRIAGE NOTES
off and on chest pain for 4 days. In tonight because pain would not go away. Some sob, and dizziness with pain. Denies nausea, sweatiness with pain. States upper back is tight. 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 Pain less severe when laying down. States is worse when up and walking is worse.

## 2022-07-13 NOTE — ED PROVIDER NOTES
EMERGENCY DEPARTMENT ENCOUNTER      NAME: James Carroll  AGE: 27 year old male  YOB: 1995  MRN: [id number]  EVALUATION DATE & TIME: 7/13/2022  2:04 AM    PCP: No Ref-Primary, Physician    ED PROVIDER: Geoffrey Bob M.D.      Chief Complaint   Patient presents with     Chest Pain     Shortness of Breath         FINAL IMPRESSION:  1. Chest pain, unspecified type          ED COURSE & MEDICAL DECISION MAKING:    Pertinent Labs & Imaging studies reviewed. (See chart for details)  27 year old male presents to the Emergency Department for evaluation of chest pain or shortness of breath.  Been going on for quite some time but worse over the last week.  EKG is negative.  Troponin is negative.  Given the amount of time he is had chest pain I will think serial troponins are negative.  He is low risk for cardiac disease.  Patient negative by PERC criteria.  I do not think this is PE.  Chest x-ray is clear.  Labs otherwise unremarkable.  Unclear what is causing his chest pain or shortness of breath.  Do not see an emergent cause.  No signs of dissection pneumothorax or pneumonia.  Will discharge home.  Will follow up with primary.  Did put in a referral for primary care.    2:06 AM I met with the patient to gather history and to perform my initial exam. I discussed the plan for care while in the Emergency Department. PPE: eye protection, surgical mask and nitrile gloves.  3:00 AM Results were communicated with the patient. Discussed discharge plans along with return precautions. Patient was agreeable with plan.        At the conclusion of the encounter I discussed the results of all of the tests and the disposition. The questions were answered. The patient or family acknowledged understanding and was agreeable with the care plan.       MEDICATIONS GIVEN IN THE EMERGENCY:  Medications - No data to display    NEW PRESCRIPTIONS STARTED AT TODAY'S ER VISIT  New Prescriptions    No medications on file           =================================================================    HPI    Patient information was obtained from: patient     Use of : N/A        James Carroll is a 27 year old male with a pertinent history of migraine headaches, who presents to this ED via walk in for evaluation of shortness of breath, dizziness, lightheadness, mid chest pressure.    Patient here today for worsening mid-chest pressure tightness that has been constant over the past day. Initially he had it intermittently last week but had came back today. He did get episodes of dizziness as a child and also felt it today with lightheadness as well. There is no radiation of his pain. He has also been slightly short of breath. He was a former smoker but now only smokes about 1-2 times a week. He does not have a primary care provider but does go to a regular clinic. No daily prescription medications. No associated fever, cough, changes in bowel or bladder habits, nausea, vomiting, diarrhea. No other medical complaints at this time.       REVIEW OF SYSTEMS   Review of Systems   Constitutional: Negative for fever.   Respiratory: Positive for chest tightness (mid chest pressure) and shortness of breath. Negative for cough.    Gastrointestinal: Negative.  Negative for diarrhea, nausea and vomiting.   Genitourinary: Negative.    Neurological: Positive for dizziness and light-headedness.   All other systems reviewed and are negative.       PAST MEDICAL HISTORY:  Past Medical History:   Diagnosis Date     Asthma        PAST SURGICAL HISTORY:  Past Surgical History:   Procedure Laterality Date     ANKLE SURGERY      Twice     HERNIA REPAIR, UMBILICAL N/A 1/28/2020    Procedure: HERNIORRHAPHY, UMBILICAL, OPEN;  Surgeon: Fran Cheng MD;  Location: [facility];  Service: General     REMOVE HARDWARE LOWER EXTREMITY Right 3/24/2021    Procedure: REMOVAL, HARDWARE, RIGHT ANKLE;  Surgeon: Bryce Mott DO;  Location: [facility]  Main OR;  Service: Orthopedics     WISDOM TOOTH EXTRACTION       ZZC OSTEOTOMY TIBIA Right 3/24/2021    Procedure: RIGHT MEDIAL MALLEOLAR OSTEOTOMY WITH BULK OSTEOCHONDRAL ALLOGRAFT TALAR DOME;  Surgeon: Bryce Mott DO;  Location: [facility] Main OR;  Service: Orthopedics           CURRENT MEDICATIONS:    No current facility-administered medications for this encounter.     Current Outpatient Medications   Medication     acetaminophen (TYLENOL) 325 MG tablet     aspirin 81 MG EC tablet     hydrOXYzine HCL (ATARAX) 25 MG tablet     ibuprofen (ADVIL,MOTRIN) 200 MG tablet     oxyCODONE (ROXICODONE) 5 MG immediate release tablet     senna-docusate (PERICOLACE) 8.6-50 mg tablet         ALLERGIES:  No Known Allergies    FAMILY HISTORY:  History reviewed. No pertinent family history.    SOCIAL HISTORY:   Social History     Socioeconomic History     Marital status: Single   Tobacco Use     Smoking status: Former Smoker     Smokeless tobacco: Former User   Substance and Sexual Activity     Alcohol use: Not Currently     Drug use: Never       VITALS:  /84   Pulse 65   Temp 98.2  F (36.8  C) (Temporal)   Resp 14   Wt 79.4 kg (175 lb)   SpO2 100%   BMI 23.73 kg/m      PHYSICAL EXAM    Physical Exam  Constitutional:       General: He is not in acute distress.     Appearance: He is not diaphoretic.   HENT:      Head: Atraumatic.   Eyes:      General: No scleral icterus.     Pupils: Pupils are equal, round, and reactive to light.   Cardiovascular:      Heart sounds: Normal heart sounds.   Pulmonary:      Effort: No respiratory distress.      Breath sounds: Normal breath sounds.   Abdominal:      General: Bowel sounds are normal.      Palpations: Abdomen is soft.      Tenderness: There is no abdominal tenderness.   Musculoskeletal:         General: No tenderness.   Skin:     General: Skin is warm.      Findings: No rash.           LAB:  All pertinent labs reviewed and interpreted.  Labs Ordered and Resulted from  Time of ED Arrival to Time of ED Departure   COMPREHENSIVE METABOLIC PANEL - Normal       Result Value    Sodium 139      Potassium 4.5      Chloride 107      Carbon Dioxide (CO2) 26      Anion Gap 6      Urea Nitrogen 13      Creatinine 0.82      Calcium 9.5      Glucose 88      Alkaline Phosphatase 61      AST 29      ALT 26      Protein Total 7.1      Albumin 4.3      Bilirubin Total 0.6      GFR Estimate >90     TROPONIN I - Normal    Troponin I <0.01     MAGNESIUM - Normal    Magnesium 2.1     CBC WITH PLATELETS AND DIFFERENTIAL    WBC Count 7.4      RBC Count 4.82      Hemoglobin 15.0      Hematocrit 43.8      MCV 91      MCH 31.1      MCHC 34.2      RDW 11.5      Platelet Count 227      % Neutrophils 66      % Lymphocytes 25      % Monocytes 7      % Eosinophils 1      % Basophils 1      % Immature Granulocytes 0      NRBCs per 100 WBC 0      Absolute Neutrophils 4.9      Absolute Lymphocytes 1.9      Absolute Monocytes 0.5      Absolute Eosinophils 0.1      Absolute Basophils 0.1      Absolute Immature Granulocytes 0.0      Absolute NRBCs 0.0         RADIOLOGY:  Reviewed all pertinent imaging. Please see official radiology report.  XR Chest 2 Views   Final Result   IMPRESSION: Negative chest.          EKG:    Performed at: 0044  Impression: Sinus rhythm with no acute abnormalities.  Unchanged from previous dated November 30, 2010  Sinus rhythm ventricular to 73.  Parable 142.  QRS 98.  QTc 403    I have independently reviewed and interpreted the EKG(s) documented above.      I, Trinity Hendrix, am serving as a scribe to document services personally performed by Dr. Geoffrey Bob, based on my observation and the provider's statements to me. I, Geoffrey Bob MD attest that Trinity Hendrix is acting in a scribe capacity, has observed my performance of the services and has documented them in accordance with my direction.    Geoffrey Bob M.D.  Emergency Medicine  [facility] EMERGENCY ROOM  [facility] [facility] [address]  [phone]  Dept: [phone]     Geoffrey Bob MD  07/13/22 0527

## 2022-07-14 LAB
ATRIAL RATE - MUSE: 73 BPM
DIASTOLIC BLOOD PRESSURE - MUSE: NORMAL MMHG
INTERPRETATION ECG - MUSE: NORMAL
P AXIS - MUSE: 68 DEGREES
PR INTERVAL - MUSE: 142 MS
QRS DURATION - MUSE: 98 MS
QT - MUSE: 366 MS
QTC - MUSE: 403 MS
R AXIS - MUSE: 46 DEGREES
SYSTOLIC BLOOD PRESSURE - MUSE: NORMAL MMHG
T AXIS - MUSE: 61 DEGREES
VENTRICULAR RATE- MUSE: 73 BPM

## 2022-09-03 ENCOUNTER — HEALTH MAINTENANCE LETTER (OUTPATIENT)
Age: 27
End: 2022-09-03

## 2023-07-22 ENCOUNTER — HEALTH MAINTENANCE LETTER (OUTPATIENT)
Age: 28
End: 2023-07-22

## 2024-06-12 ENCOUNTER — HOSPITAL ENCOUNTER (EMERGENCY)
Facility: CLINIC | Age: 29
Discharge: HOME | End: 2024-06-12
Admitting: PHYSICIAN ASSISTANT
Payer: COMMERCIAL

## 2024-06-12 VITALS
HEIGHT: 72 IN | HEART RATE: 65 BPM | RESPIRATION RATE: 20 BRPM | OXYGEN SATURATION: 97 % | SYSTOLIC BLOOD PRESSURE: 113 MMHG | BODY MASS INDEX: 25.06 KG/M2 | TEMPERATURE: 98.5 F | WEIGHT: 185 LBS | DIASTOLIC BLOOD PRESSURE: 64 MMHG

## 2024-06-12 DIAGNOSIS — Z98.890 H/O CERVICAL DISCECTOMY: ICD-10-CM

## 2024-06-12 DIAGNOSIS — R55 SYNCOPE AND COLLAPSE: ICD-10-CM

## 2024-06-12 PROBLEM — G43.009 MIGRAINE WITHOUT AURA, NOT INTRACTABLE, WITHOUT STATUS MIGRAINOSUS: Status: ACTIVE | Noted: 2024-06-12

## 2024-06-12 PROBLEM — M25.571 CHRONIC PAIN OF RIGHT ANKLE: Status: ACTIVE | Noted: 2021-03-18

## 2024-06-12 PROBLEM — G89.29 CHRONIC PAIN OF RIGHT ANKLE: Status: ACTIVE | Noted: 2021-03-18

## 2024-06-12 PROBLEM — M95.8 OSTEOCHONDRAL DEFECT OF TALUS: Status: ACTIVE | Noted: 2022-05-24

## 2024-06-12 PROBLEM — E23.6 CYST OF PITUITARY GLAND (H): Status: ACTIVE | Noted: 2023-03-14

## 2024-06-12 PROBLEM — M93.271: Status: ACTIVE | Noted: 2024-06-12

## 2024-06-12 PROBLEM — S92.141G: Status: ACTIVE | Noted: 2022-05-24

## 2024-06-12 LAB
ANION GAP SERPL CALCULATED.3IONS-SCNC: 10 MMOL/L (ref 7–15)
ATRIAL RATE - MUSE: 59 BPM
BUN SERPL-MCNC: 11.7 MG/DL (ref 6–20)
CALCIUM SERPL-MCNC: 8.5 MG/DL (ref 8.6–10)
CHLORIDE SERPL-SCNC: 107 MMOL/L (ref 98–107)
CREAT SERPL-MCNC: 0.77 MG/DL (ref 0.67–1.17)
DEPRECATED HCO3 PLAS-SCNC: 23 MMOL/L (ref 22–29)
DIASTOLIC BLOOD PRESSURE - MUSE: 57 MMHG
EGFRCR SERPLBLD CKD-EPI 2021: >90 ML/MIN/1.73M2
ERYTHROCYTE [DISTWIDTH] IN BLOOD BY AUTOMATED COUNT: 11.4 % (ref 10–15)
GLUCOSE BLDC GLUCOMTR-MCNC: 154 MG/DL (ref 70–99)
GLUCOSE SERPL-MCNC: 119 MG/DL (ref 70–99)
HCT VFR BLD AUTO: 41.6 % (ref 40–53)
HGB BLD-MCNC: 14.8 G/DL (ref 13.3–17.7)
HOLD SPECIMEN: NORMAL
INTERPRETATION ECG - MUSE: NORMAL
MAGNESIUM SERPL-MCNC: 1.9 MG/DL (ref 1.7–2.3)
MCH RBC QN AUTO: 30.9 PG (ref 26.5–33)
MCHC RBC AUTO-ENTMCNC: 35.6 G/DL (ref 31.5–36.5)
MCV RBC AUTO: 87 FL (ref 78–100)
P AXIS - MUSE: 48 DEGREES
PLATELET # BLD AUTO: 202 10E3/UL (ref 150–450)
POTASSIUM SERPL-SCNC: 3.4 MMOL/L (ref 3.4–5.3)
PR INTERVAL - MUSE: 150 MS
QRS DURATION - MUSE: 108 MS
QT - MUSE: 426 MS
QTC - MUSE: 421 MS
R AXIS - MUSE: 41 DEGREES
RBC # BLD AUTO: 4.79 10E6/UL (ref 4.4–5.9)
SODIUM SERPL-SCNC: 140 MMOL/L (ref 135–145)
SYSTOLIC BLOOD PRESSURE - MUSE: 108 MMHG
T AXIS - MUSE: 50 DEGREES
TROPONIN T SERPL HS-MCNC: <6 NG/L
VENTRICULAR RATE- MUSE: 59 BPM
WBC # BLD AUTO: 12.8 10E3/UL (ref 4–11)

## 2024-06-12 PROCEDURE — 99284 EMERGENCY DEPT VISIT MOD MDM: CPT

## 2024-06-12 PROCEDURE — 93005 ELECTROCARDIOGRAM TRACING: CPT | Performed by: PHYSICIAN ASSISTANT

## 2024-06-12 PROCEDURE — 250N000013 HC RX MED GY IP 250 OP 250 PS 637: Performed by: PHYSICIAN ASSISTANT

## 2024-06-12 PROCEDURE — 82962 GLUCOSE BLOOD TEST: CPT

## 2024-06-12 PROCEDURE — 36415 COLL VENOUS BLD VENIPUNCTURE: CPT | Performed by: PHYSICIAN ASSISTANT

## 2024-06-12 PROCEDURE — 83735 ASSAY OF MAGNESIUM: CPT | Performed by: PHYSICIAN ASSISTANT

## 2024-06-12 PROCEDURE — 82565 ASSAY OF CREATININE: CPT | Performed by: PHYSICIAN ASSISTANT

## 2024-06-12 PROCEDURE — 82374 ASSAY BLOOD CARBON DIOXIDE: CPT | Performed by: PHYSICIAN ASSISTANT

## 2024-06-12 PROCEDURE — 85041 AUTOMATED RBC COUNT: CPT | Performed by: PHYSICIAN ASSISTANT

## 2024-06-12 PROCEDURE — 84484 ASSAY OF TROPONIN QUANT: CPT | Performed by: PHYSICIAN ASSISTANT

## 2024-06-12 RX ORDER — OXYCODONE HYDROCHLORIDE 5 MG/1
5 TABLET ORAL ONCE
Status: COMPLETED | OUTPATIENT
Start: 2024-06-12 | End: 2024-06-12

## 2024-06-12 RX ADMIN — OXYCODONE HYDROCHLORIDE 5 MG: 5 TABLET ORAL at 11:57

## 2024-06-12 ASSESSMENT — COLUMBIA-SUICIDE SEVERITY RATING SCALE - C-SSRS
2. HAVE YOU ACTUALLY HAD ANY THOUGHTS OF KILLING YOURSELF IN THE PAST MONTH?: NO
6. HAVE YOU EVER DONE ANYTHING, STARTED TO DO ANYTHING, OR PREPARED TO DO ANYTHING TO END YOUR LIFE?: NO
1. IN THE PAST MONTH, HAVE YOU WISHED YOU WERE DEAD OR WISHED YOU COULD GO TO SLEEP AND NOT WAKE UP?: NO

## 2024-06-12 ASSESSMENT — ACTIVITIES OF DAILY LIVING (ADL)
ADLS_ACUITY_SCORE: 35
ADLS_ACUITY_SCORE: 35

## 2024-06-12 NOTE — DISCHARGE INSTRUCTIONS
You were seen in the emergency department for an episode of syncope, or passing out, while driving home from the hospital this morning.     Thankfully, your labs are reassuring.  Your electrolytes are normal.  Your heart enzyme is normal and your EKG did not show any cardiac arrhythmia or other abnormality.  I talked your surgeon that he did not have any concerns about the vessels in your neck.    It is unclear what caused your fainting this morning, however I suspect an element of vasovagal syncope, this may be related to your pain medication or a lag from the anesthesia medication.    Continue to stay hydrated, take your pain medication as directed and maintain light activity as directed by your surgeon.  Follow-up in their clinic.      Return to the ER if you have any chest pain, shortness of breath, numbness/tingling or other concerns.

## 2024-06-12 NOTE — ED NOTES
Bed: [facility]  Expected date:   Expected time:   Means of arrival:   Comments:  EMS

## 2024-06-12 NOTE — ED TRIAGE NOTES
Pt arrived by EMS, had spine surgery yesterday to C spine for disc removal per EMS, and was being discharged today and father was driving pt home and pt had 2 syncopal episodes in the car so father pulled over and called EMS. When EMS arrived by hypotensive and pale. IV placed, . Pt alert on arrival VSS. Mother with pt. Denies chest pain, only c/o neck pain to surgical site.      Triage Assessment (Adult)       Row Name 06/12/24 1013          Triage Assessment    Airway WDL WDL        Respiratory WDL    Respiratory WDL WDL        Skin Circulation/Temperature WDL    Skin Circulation/Temperature WDL WDL

## 2024-06-12 NOTE — ED PROVIDER NOTES
EMERGENCY DEPARTMENT ENCOUNTER      NAME: James Carroll  AGE: 29 year old male  YOB: 1995  MRN: [id number]  EVALUATION DATE & TIME: 6/12/2024 10:10 AM    PCP: No Ref-Primary, Physician    ED PROVIDER: Adia Carranza PA-C      Chief Complaint   Patient presents with    Syncope     FINAL IMPRESSION:  1. Syncope and collapse    2. H/O cervical discectomy        ED COURSE  10:10 AM  Met and evaluated patient. Discussed ED plan.   10:20 AM Placed call to patient's spine surgeon Dr. Omero Silva  10: 45 AM rechecked patient who is unchanged, no compaints  11:00 AM Spine re-paged   11:25 AM rechecked patient who is unchanged, no compaints  11:30 AM Awaiting spine call   11:36 AM Discussed with Dr. Michi Silva who does not suspect syncope related to surgical vertebral dissection or other related surgical complication  12:25 PM discharged to home in good condition by RN.          MEDICAL DECISION MAKING:    Pertinent Labs & Imaging studies reviewed. (See chart for details)  29 year old male with a h/o recent cervical discectomy yesterday (6/11/24) with Dr. Omero Silva of [facility] presents via EMS to the Emergency Department for evaluation of witness syncopal event while driving home from hospital in the car today. On exam he is alert, non toxic appearing and in no acute distress. Vitals are WNL. Surgical incision anterior cervical neck is covered. No drainage around bandage. Neck is symmetric without evidence of hematoma. Cranial nerves II-XII in tact. No focal neurological deficit.     Differential includes vasovagal event, cardiac arrhythmia, cervical/vertebral artery dissection, medication interaction, hypoglycemia//other electrolyte derangement. Discussed with spine surgery who reassures that surgical area not related to vertebral artery as he had C6-C7 instrumentation and patient did not have abhorrent artery location (typically C5) so dissection not expected and no other surgical  complication suspected at this time.  CBC with slight leukocytosis, suspect this is related to instrumentation yesterday.  BMP reassuring.  EKG shows normal sinus without ST elevation/depression or reciprocal changes, notably there is no sign of WPW, Brugada or other dysrhythmia.  Troponin undetectable.  Patient had no change or abnormality in neurological status throughout his 2 hours in the emergency department.  Patient and mother felt comfortable taking him home.  He was encouraged to continue with small frequent meals, remaining hydrated and light activity per his surgeon.    There is no evidence of acute or emergent process requiring intervention at this time. Pt is appropriate for outpatient management. Provisional nature of today's diagnosis was discussed and strict return precautions were given. Pt expressed understanding and He was discharged to home in good condition.     Medical Decision Making  Obtained supplemental history:Supplemental history obtained?: Documented in chart and Family Member/Significant Other mother  Reviewed external records: External records reviewed?: Documented in chart and Outpatient Record: previous ED visit in 2022 for chest pain. [place] records not availabel for review at this time  Care impacted by chronic illness:Other: migraine  Care significantly affected by social determinants of health:N/A  Did you consider but not order tests?: Work up considered but not performed and documented in chart, if applicable  Did you interpret images independently?: Independent interpretation of ECG and images noted in documentation, when applicable.  Consultation discussion with other provider:Did you involve another provider (consultant, MH, pharmacy, etc.)?: I discussed the care with another health care provider, see documentation for details.  Discharge. I recommended the patient continue their current prescription strength medication(s): oxycodone per surgeon. See documentation for any  additional details.        CRITICAL CARE: None      MEDICATIONS GIVEN IN THE EMERGENCY:  Medications   oxyCODONE (ROXICODONE) tablet 5 mg (5 mg Oral $Given 6/12/24 8191)       NEW PRESCRIPTIONS STARTED AT TODAY'S ER VISIT  New Prescriptions    No medications on file          =================================================================    [facility]    Patient information was obtained from: patient, mother, father    Use of Intrepreter: N/A     James Carroll is a 29 year old male who presents for evaluation of sudden syncope and collapse witnessed by his dad this morning, he was driving home from surgical center after having a cervical discectomy yesterday.  He was observed for 23 hours after surgery and had no difficulties while in hospital.  He ate breakfast this morning.  Did not report any prodromal symptoms such as lightheadedness, dizziness, nausea, tunnel vision or whooshing in his ears.  He does state that he felt like he had some muffled hearing when he awoke after his syncopal episode.  Father describes that he actually had 3 back-to-back episodes of slumping over and becoming unresponsive while in the car resulting in father calling 911.    Patient tells me that he does not have any pain other than his anterior cervical surgical incision.  He has no lateral neck pain, headache.  Denies any dizziness, double vision, vertiginous symptoms.  He has no numbness or tingling of the extremities.  No speech difficulties or other described neurological changes. No heart palpitations or chest pain. No shortness of breath.     He did have oxycodone for pain this AM which he has had in the past and tolerated without difficulty.     He is here with his mother who describes a history of lightheadedness but never syncope in the past.      REVIEW OF SYSTEMS   As noted in HPI. All other systems negative.    PAST MEDICAL HISTORY:  Past Medical History:   Diagnosis Date    Asthma        PAST SURGICAL HISTORY:  Past  Surgical History:   Procedure Laterality Date    ANKLE SURGERY      Twice    HERNIA REPAIR, UMBILICAL N/A 1/28/2020    Procedure: HERNIORRHAPHY, UMBILICAL, OPEN;  Surgeon: Fran Cheng MD;  Location: [facility];  Service: General    REMOVE HARDWARE LOWER EXTREMITY Right 3/24/2021    Procedure: REMOVAL, HARDWARE, RIGHT ANKLE;  Surgeon: Bryce Mott DO;  Location: [facility];  Service: Orthopedics    WISDOM TOOTH EXTRACTION      ZZC OSTEOTOMY TIBIA Right 3/24/2021    Procedure: RIGHT MEDIAL MALLEOLAR OSTEOTOMY WITH BULK OSTEOCHONDRAL ALLOGRAFT TALAR DOME;  Surgeon: Bryce Mott DO;  Location: [facility];  Service: Orthopedics           CURRENT MEDICATIONS:    acetaminophen (TYLENOL) 325 MG tablet  aspirin 81 MG EC tablet  hydrOXYzine HCL (ATARAX) 25 MG tablet  ibuprofen (ADVIL,MOTRIN) 200 MG tablet  oxyCODONE (ROXICODONE) 5 MG immediate release tablet  senna-docusate (PERICOLACE) 8.6-50 mg tablet        ALLERGIES:  No Known Allergies    FAMILY HISTORY:  No family history on file.    SOCIAL HISTORY:   Social History     Socioeconomic History    Marital status: Single     Spouse name: Not on file    Number of children: Not on file    Years of education: Not on file    Highest education level: Not on file   Occupational History    Not on file   Tobacco Use    Smoking status: Former    Smokeless tobacco: Former   Substance and Sexual Activity    Alcohol use: Not Currently    Drug use: Never    Sexual activity: Not on file   Other Topics Concern    Not on file   Social History Narrative    Not on file     Social Determinants of Health     Financial Resource Strain: High Risk (1/1/2022)    [facility]    Financial Resource Strain     Difficulty of Paying Living Expenses: Not on file     Difficulty of Paying Living Expenses: Not on file   Food Insecurity: Not on file   Transportation Needs: Not on file   Physical Activity: Not on file    Stress: Not on file   Social Connections: Unknown (1/1/2022)    [facility]    Social Connections     Frequency of Communication with Friends and Family: Not on file   Interpersonal Safety: Not on file   Housing Stability: Not on file         VITALS:  Patient Vitals for the past 24 hrs:   BP Temp Temp src Pulse Resp SpO2 Height Weight   06/12/24 1012 114/66 98.1  F (36.7  C) Temporal 65 14 96 % 1.829 m (6') 83.9 kg (185 lb)       PHYSICAL EXAM    Physical Exam  Vitals reviewed.   Constitutional:       General: He is not in acute distress.     Appearance: Normal appearance. He is not ill-appearing, toxic-appearing or diaphoretic.   HENT:      Nose: Nose normal.      Mouth/Throat:      Mouth: Mucous membranes are moist.   Eyes:      General:         Right eye: No discharge.         Left eye: No discharge.      Extraocular Movements: Extraocular movements intact.      Pupils: Pupils are equal, round, and reactive to light.   Neck:      Comments: Surgical incision anterior neck noted, no drainage around bandage. No neck asymmetry. No stridor.  Cardiovascular:      Rate and Rhythm: Normal rate and regular rhythm.      Pulses: Normal pulses.      Heart sounds: Normal heart sounds. No murmur heard.  Pulmonary:      Effort: Pulmonary effort is normal. No respiratory distress.      Breath sounds: No stridor. No wheezing, rhonchi or rales.   Musculoskeletal:         General: Normal range of motion.   Skin:     General: Skin is warm.      Capillary Refill: Capillary refill takes less than 2 seconds.   Neurological:      Mental Status: He is alert.      Comments: Alert & oriented to person, place and time. Normal tone. PERRL. Normal speech, no dysarthria. CN 2 full visual fields, CN 3/4/6 EOMI without nystagmus, CN 5 sensory intact, CN 7 motor intact, face symmetric, CN 8 hearing intact, CN 9/10/11 normal strength, CN 12 tongue midline.  Motor: Spontaneously moves all  extremities.   strength appropriate and symmetric. Finger nose finger without ataxia. Normal rapid alternating movements.  Sensory: Intact face x3, UE, LE. gait: steady, balanced. Psychomotor slowing (-). Abnormal Movements (-).             LAB:  All pertinent labs reviewed and interpreted.    Labs Ordered and Resulted from Time of ED Arrival to Time of ED Departure   CBC WITH PLATELETS - Abnormal       Result Value    WBC Count 12.8 (*)     RBC Count 4.79      Hemoglobin 14.8      Hematocrit 41.6      MCV 87      MCH 30.9      MCHC 35.6      RDW 11.4      Platelet Count 202     BASIC METABOLIC PANEL - Abnormal    Sodium 140      Potassium 3.4      Chloride 107      Carbon Dioxide (CO2) 23      Anion Gap 10      Urea Nitrogen 11.7      Creatinine 0.77      GFR Estimate >90      Calcium 8.5 (*)     Glucose 119 (*)    GLUCOSE BY METER - Abnormal    GLUCOSE BY METER POCT 154 (*)    MAGNESIUM - Normal    Magnesium 1.9     TROPONIN T, HIGH SENSITIVITY - Normal    Troponin T, High Sensitivity <6     GLUCOSE MONITOR NURSING POCT         RADIOLOGY:  Reviewed all pertinent imaging. Please see official radiology report    No orders to display         EKG:    Performed at: 10:15:50  Impression: Sinus bradycardia, normal ECG  Vent rate: 59 bpm  Pr interval: 150 ms  QRS duration: 108 ms  QT/Qtc: 426/421 ms  Dr. Carlisle and I have independently reviewed and interpreted the EKG(s) documented above.      Adia Carranza PA-C  Emergency Medicine  [facility] EMERGENCY ROOM  [facility] [facility] [address] [phone]  Dept: [phone]    This note has in part been created with speech recognition technology and may create an occasional, unintended word/grammar substitution. Errors are generally corrected in real time. Please message me via [employer] In Basket if you note any errors requiring clarification.       Adia Carranza PA-C  06/12/24  1225

## 2024-06-12 NOTE — ED TRIAGE NOTES
Triage Assessment (Adult)       Row Name 06/12/24 1013          Triage Assessment    Airway WDL WDL        Respiratory WDL    Respiratory WDL WDL        Skin Circulation/Temperature WDL    Skin Circulation/Temperature WDL WDL

## 2024-09-14 ENCOUNTER — HEALTH MAINTENANCE LETTER (OUTPATIENT)
Age: 29
End: 2024-09-14